# Patient Record
Sex: MALE | Race: WHITE | NOT HISPANIC OR LATINO
[De-identification: names, ages, dates, MRNs, and addresses within clinical notes are randomized per-mention and may not be internally consistent; named-entity substitution may affect disease eponyms.]

---

## 2021-11-03 ENCOUNTER — APPOINTMENT (OUTPATIENT)
Dept: OTOLARYNGOLOGY | Facility: CLINIC | Age: 71
End: 2021-11-03
Payer: MEDICARE

## 2021-11-03 VITALS
DIASTOLIC BLOOD PRESSURE: 90 MMHG | HEIGHT: 69 IN | WEIGHT: 170 LBS | BODY MASS INDEX: 25.18 KG/M2 | SYSTOLIC BLOOD PRESSURE: 161 MMHG | HEART RATE: 66 BPM

## 2021-11-03 DIAGNOSIS — Z86.79 PERSONAL HISTORY OF OTHER DISEASES OF THE CIRCULATORY SYSTEM: ICD-10-CM

## 2021-11-03 DIAGNOSIS — H91.90 UNSPECIFIED HEARING LOSS, UNSPECIFIED EAR: ICD-10-CM

## 2021-11-03 DIAGNOSIS — H93.11 TINNITUS, RIGHT EAR: ICD-10-CM

## 2021-11-03 DIAGNOSIS — K21.9 GASTRO-ESOPHAGEAL REFLUX DISEASE W/OUT ESOPHAGITIS: ICD-10-CM

## 2021-11-03 DIAGNOSIS — H60.60 UNSPECIFIED CHRONIC OTITIS EXTERNA, UNSPECIFIED EAR: ICD-10-CM

## 2021-11-03 DIAGNOSIS — H61.23 IMPACTED CERUMEN, BILATERAL: ICD-10-CM

## 2021-11-03 PROCEDURE — 99203 OFFICE O/P NEW LOW 30 MIN: CPT

## 2021-11-03 PROCEDURE — G0268 REMOVAL OF IMPACTED WAX MD: CPT

## 2021-11-03 PROCEDURE — 92557 COMPREHENSIVE HEARING TEST: CPT

## 2021-11-03 PROCEDURE — 92567 TYMPANOMETRY: CPT

## 2021-11-03 NOTE — DATA REVIEWED
[de-identified] : AD: Type Ad\par AS: Type A\par Hearing is WNL, 250-1000 Hz, mild to severe SNHL, 1495-1461 Hz, AU\par Good SRS AU\par REC: ENT f/u, Further testing per MD\par

## 2021-11-03 NOTE — ASSESSMENT
[FreeTextEntry1] : reviewed and reconciled meds, allergies, pmhx, famhx, sochx, surghx\par \par Noticeable hearing loss and right tinnitus\par Bilateral cerumen impactions removed\par \par \par \par \par Plan\par Audio reviewed mild snhl borderline candidate for hearing aids.\par Bilateral cerumen impactions removed\par \par fu 6 months\par

## 2021-11-03 NOTE — PHYSICAL EXAM
[Hearing Wells Test (Tuning Fork On Forehead)] : no lateralization of tone [Normal] : mucosa is normal [Midline] : trachea located in midline position [Removed] : palatine tonsils previously removed [FreeTextEntry8] : cerumen impaction removed by curettage,suction and irrigation [FreeTextEntry9] : cerumen impaction removed by curettage, suction and irrigation [FreeTextEntry1] : deviated septum, thick mucoid secretions

## 2021-11-03 NOTE — REVIEW OF SYSTEMS
[Ear Pain] : ear pain [Ear Itch] : ear itch [Hearing Loss] : hearing loss [Vertigo] : vertigo [Recurrent Ear Infections] : recurrent ear infections [Joint Pain] : joint pain [Itching] : itching [Fainting] : fainting [Anxiety] : anxiety [Depression] : depression [Negative] : Heme/Lymph [FreeTextEntry6] : noisy breathing  [FreeTextEntry1] : fatigue, daytime sleepiness, rash

## 2021-11-03 NOTE — HISTORY OF PRESENT ILLNESS
[de-identified] : 71 y.o male with h/o CVA presents feeling clogged in both ears. His caregiver who is also his nephew is giving most of history. Nephew is noticing patient is having trouble hearing. Has longstanding history of tinnitus in right ear. Sometimes he's unsure if he's not responding because he believes he suffers from undiagnosed Aspergers. Family hx of hearing loss but no hx of loud noise exposure.

## 2022-05-06 ENCOUNTER — APPOINTMENT (OUTPATIENT)
Dept: OTOLARYNGOLOGY | Facility: CLINIC | Age: 72
End: 2022-05-06

## 2025-07-21 ENCOUNTER — APPOINTMENT (OUTPATIENT)
Dept: OTOLARYNGOLOGY | Facility: CLINIC | Age: 75
End: 2025-07-21

## 2025-07-21 ENCOUNTER — NON-APPOINTMENT (OUTPATIENT)
Age: 75
End: 2025-07-21

## 2025-07-21 VITALS
HEIGHT: 69 IN | HEART RATE: 82 BPM | WEIGHT: 174 LBS | BODY MASS INDEX: 25.77 KG/M2 | SYSTOLIC BLOOD PRESSURE: 151 MMHG | DIASTOLIC BLOOD PRESSURE: 90 MMHG

## 2025-07-21 DIAGNOSIS — J31.0 CHRONIC RHINITIS: ICD-10-CM

## 2025-07-21 DIAGNOSIS — H91.90 UNSPECIFIED HEARING LOSS, UNSPECIFIED EAR: ICD-10-CM

## 2025-07-21 DIAGNOSIS — Z86.39 PERSONAL HISTORY OF OTHER ENDOCRINE, NUTRITIONAL AND METABOLIC DISEASE: ICD-10-CM

## 2025-07-21 DIAGNOSIS — J34.2 DEVIATED NASAL SEPTUM: ICD-10-CM

## 2025-07-21 DIAGNOSIS — H61.23 IMPACTED CERUMEN, BILATERAL: ICD-10-CM

## 2025-07-21 DIAGNOSIS — H60.60 UNSPECIFIED CHRONIC OTITIS EXTERNA, UNSPECIFIED EAR: ICD-10-CM

## 2025-07-21 DIAGNOSIS — Z83.3 FAMILY HISTORY OF DIABETES MELLITUS: ICD-10-CM

## 2025-07-21 PROCEDURE — 69210 REMOVE IMPACTED EAR WAX UNI: CPT

## 2025-07-21 PROCEDURE — 99203 OFFICE O/P NEW LOW 30 MIN: CPT | Mod: 25

## 2025-07-21 RX ORDER — QUETIAPINE FUMARATE 400 MG/1
TABLET ORAL
Refills: 0 | Status: ACTIVE | COMMUNITY